# Patient Record
Sex: FEMALE | Race: BLACK OR AFRICAN AMERICAN
[De-identification: names, ages, dates, MRNs, and addresses within clinical notes are randomized per-mention and may not be internally consistent; named-entity substitution may affect disease eponyms.]

---

## 2021-01-19 ENCOUNTER — HOSPITAL ENCOUNTER (OUTPATIENT)
Dept: HOSPITAL 61 - ER | Age: 81
Setting detail: OBSERVATION
LOS: 1 days | Discharge: HOME | End: 2021-01-20
Attending: INTERNAL MEDICINE | Admitting: INTERNAL MEDICINE
Payer: MEDICARE

## 2021-01-19 VITALS — SYSTOLIC BLOOD PRESSURE: 122 MMHG | DIASTOLIC BLOOD PRESSURE: 68 MMHG

## 2021-01-19 VITALS — BODY MASS INDEX: 31.8 KG/M2 | HEIGHT: 61 IN | WEIGHT: 168.43 LBS

## 2021-01-19 VITALS — SYSTOLIC BLOOD PRESSURE: 175 MMHG | DIASTOLIC BLOOD PRESSURE: 75 MMHG

## 2021-01-19 DIAGNOSIS — R29.705: ICD-10-CM

## 2021-01-19 DIAGNOSIS — Z23: ICD-10-CM

## 2021-01-19 DIAGNOSIS — R79.1: ICD-10-CM

## 2021-01-19 DIAGNOSIS — F12.90: ICD-10-CM

## 2021-01-19 DIAGNOSIS — M54.5: ICD-10-CM

## 2021-01-19 DIAGNOSIS — Z98.890: ICD-10-CM

## 2021-01-19 DIAGNOSIS — F17.210: ICD-10-CM

## 2021-01-19 DIAGNOSIS — I10: ICD-10-CM

## 2021-01-19 DIAGNOSIS — F41.9: ICD-10-CM

## 2021-01-19 DIAGNOSIS — Z79.82: ICD-10-CM

## 2021-01-19 DIAGNOSIS — Z90.49: ICD-10-CM

## 2021-01-19 DIAGNOSIS — I63.9: Primary | ICD-10-CM

## 2021-01-19 LAB
ANION GAP SERPL CALC-SCNC: 8 MMOL/L (ref 6–14)
APTT BLD: 32 SEC (ref 24–38)
BUN SERPL-MCNC: 15 MG/DL (ref 7–20)
CALCIUM SERPL-MCNC: 9.3 MG/DL (ref 8.5–10.1)
CHLORIDE SERPL-SCNC: 104 MMOL/L (ref 98–107)
CO2 SERPL-SCNC: 28 MMOL/L (ref 21–32)
CREAT SERPL-MCNC: 1 MG/DL (ref 0.6–1)
ERYTHROCYTE [DISTWIDTH] IN BLOOD BY AUTOMATED COUNT: 15 % (ref 11.5–14.5)
GFR SERPLBLD BASED ON 1.73 SQ M-ARVRAT: 64.2 ML/MIN
GLUCOSE SERPL-MCNC: 90 MG/DL (ref 70–99)
HCT VFR BLD CALC: 38 % (ref 36–47)
HGB BLD-MCNC: 12.9 G/DL (ref 12–15.5)
MCH RBC QN AUTO: 35 PG (ref 25–35)
MCHC RBC AUTO-ENTMCNC: 34 G/DL (ref 31–37)
MCV RBC AUTO: 102 FL (ref 79–100)
PLATELET # BLD AUTO: 240 X10^3/UL (ref 140–400)
POTASSIUM SERPL-SCNC: 3.9 MMOL/L (ref 3.5–5.1)
PROTHROMBIN TIME: 12.9 SEC (ref 11.7–14)
RBC # BLD AUTO: 3.74 X10^6/UL (ref 3.5–5.4)
SODIUM SERPL-SCNC: 140 MMOL/L (ref 136–145)
WBC # BLD AUTO: 4.5 X10^3/UL (ref 4–11)

## 2021-01-19 PROCEDURE — 96372 THER/PROPH/DIAG INJ SC/IM: CPT

## 2021-01-19 PROCEDURE — 85610 PROTHROMBIN TIME: CPT

## 2021-01-19 PROCEDURE — 99285 EMERGENCY DEPT VISIT HI MDM: CPT

## 2021-01-19 PROCEDURE — 97161 PT EVAL LOW COMPLEX 20 MIN: CPT

## 2021-01-19 PROCEDURE — G0378 HOSPITAL OBSERVATION PER HR: HCPCS

## 2021-01-19 PROCEDURE — 97116 GAIT TRAINING THERAPY: CPT

## 2021-01-19 PROCEDURE — 93005 ELECTROCARDIOGRAM TRACING: CPT

## 2021-01-19 PROCEDURE — 97165 OT EVAL LOW COMPLEX 30 MIN: CPT

## 2021-01-19 PROCEDURE — 85027 COMPLETE CBC AUTOMATED: CPT

## 2021-01-19 PROCEDURE — 71045 X-RAY EXAM CHEST 1 VIEW: CPT

## 2021-01-19 PROCEDURE — 92610 EVALUATE SWALLOWING FUNCTION: CPT

## 2021-01-19 PROCEDURE — 70450 CT HEAD/BRAIN W/O DYE: CPT

## 2021-01-19 PROCEDURE — 36415 COLL VENOUS BLD VENIPUNCTURE: CPT

## 2021-01-19 PROCEDURE — 82962 GLUCOSE BLOOD TEST: CPT

## 2021-01-19 PROCEDURE — 70551 MRI BRAIN STEM W/O DYE: CPT

## 2021-01-19 PROCEDURE — 85730 THROMBOPLASTIN TIME PARTIAL: CPT

## 2021-01-19 PROCEDURE — 90471 IMMUNIZATION ADMIN: CPT

## 2021-01-19 PROCEDURE — 80061 LIPID PANEL: CPT

## 2021-01-19 PROCEDURE — 90686 IIV4 VACC NO PRSV 0.5 ML IM: CPT

## 2021-01-19 PROCEDURE — 80048 BASIC METABOLIC PNL TOTAL CA: CPT

## 2021-01-19 PROCEDURE — G0379 DIRECT REFER HOSPITAL OBSERV: HCPCS

## 2021-01-19 RX ADMIN — GABAPENTIN SCH MG: 300 CAPSULE ORAL at 21:03

## 2021-01-19 NOTE — RAD
EXAM: CT Head without IV contrast



INDICATION: Reason: LEFT SIDED FACIAL DROOP / Spl. Instructions:  / History: 



TECHNIQUE: Multi-detector row CT images were obtained of the head without the use of IV contrast. All
 CT scans performed at this facility utilize dose optimization techniques as appropriate to the exam,
 including the following: Automated exposure control and adjustment of the mA and/or KV according to 
patient size (this includes techniques or standardized protocols for targeted exams where dose is ind
ication/reason for exam).



COMPARISON: Noncontrast head CT of 11/13/2010



FINDINGS: 



BRAIN PARENCHYMA: No evidence of acute intraparenchymal hemorrhage or infarct. Mild generalized paren
chymal volume loss and white matter low density compatible with chronic ischemic microvascular change
 is present. Tiny lucencies in the dolly, bilateral basal ganglia, and left thalamus are present, comp
atible with multiple chronic lacunar infarcts.



VENTRICLES & EXTRA-AXIAL SPACES:  Ventricles are enlarged in proportion to the degree of parenchymal 
volume loss present. The subarachnoid hemorrhage evident previously has since resolved.. Basilar cist
erns are patent. No pathologic extra-axial fluid collection or mass.



ORBITS: Orbital contents are unremarkable.



SINUSES:  Visualized paranasal sinuses and mastoid air cells are clear.



OSSEOUS & SOFT TISSUES:  Calvarium and skull base are intact.



IMPRESSION:



Atrophy, chronic ischemic microvascular changes, and chronic lacunar infarcts. No acute intracranial 
pathology.







EXAM: XR CHEST 1V



INDICATION: Reason: LEFT SIDED FACIAL DROOP / Spl. Instructions:  / History: .



TECHNIQUE: Single view 



COMPARISON: None



FINDINGS:



The heart size is upper normal.



The great vessels appear unremarkable.



There is no hilar or mediastinal mass.



The lungs are clear.



There is no pleural effusion or pneumothorax.



There are no significant osseous abnormalities.



IMPRESSION:



Upper normal heart size with no superimposed active cardiopulmonary disease.





Electronically signed by: David Christian MD (1/19/2021 4:38 PM) [id number]

## 2021-01-19 NOTE — RAD
EXAM: CT Head without IV contrast



INDICATION: Reason: LEFT SIDED FACIAL DROOP / Spl. Instructions:  / History: 



TECHNIQUE: Multi-detector row CT images were obtained of the head without the use of IV contrast. All
 CT scans performed at this facility utilize dose optimization techniques as appropriate to the exam,
 including the following: Automated exposure control and adjustment of the mA and/or KV according to 
patient size (this includes techniques or standardized protocols for targeted exams where dose is ind
ication/reason for exam).



COMPARISON: Noncontrast head CT of 11/13/2010



FINDINGS: 



BRAIN PARENCHYMA: No evidence of acute intraparenchymal hemorrhage or infarct. Mild generalized paren
chymal volume loss and white matter low density compatible with chronic ischemic microvascular change
 is present. Tiny lucencies in the odlly, bilateral basal ganglia, and left thalamus are present, comp
atible with multiple chronic lacunar infarcts.



VENTRICLES & EXTRA-AXIAL SPACES:  Ventricles are enlarged in proportion to the degree of parenchymal 
volume loss present. The subarachnoid hemorrhage evident previously has since resolved.. Basilar cist
erns are patent. No pathologic extra-axial fluid collection or mass.



ORBITS: Orbital contents are unremarkable.



SINUSES:  Visualized paranasal sinuses and mastoid air cells are clear.



OSSEOUS & SOFT TISSUES:  Calvarium and skull base are intact.



IMPRESSION:



Atrophy, chronic ischemic microvascular changes, and chronic lacunar infarcts. No acute intracranial 
pathology.







EXAM: XR CHEST 1V



INDICATION: Reason: LEFT SIDED FACIAL DROOP / Spl. Instructions:  / History: .



TECHNIQUE: Single view 



COMPARISON: None



FINDINGS:



The heart size is upper normal.



The great vessels appear unremarkable.



There is no hilar or mediastinal mass.



The lungs are clear.



There is no pleural effusion or pneumothorax.



There are no significant osseous abnormalities.



IMPRESSION:



Upper normal heart size with no superimposed active cardiopulmonary disease.





Electronically signed by: David Christian MD (1/19/2021 4:38 PM) [id number]

## 2021-01-19 NOTE — PHYS DOC
Past Medical History


Past Medical History:  Hypertension


Additional Past Medical Histor:  "STATES HX CVA SAYS KU"


Past Surgical History:  No Surgical History


Smoking Status:  Current Every Day Smoker


Alcohol Use:  Occasionally





General Adult


EDM:


Chief Complaint:  NEURO SYMPTOMS/DEFICITS





HPI:


HPI:





Patient is a 80  year old female presents to the emergency room.  Patient states

she was sent here by her family members because she had some facial droop that 

started about 4 to 5 days ago.  Granddaughter stated at bedside states that she 

last seen her normal 3 weeks ago.  Patient states that she was talking to her 

son on the phone 4 or 5 days ago and he noticed that she was having some slurred

speech.  Patient states that she noticed a left-sided facial droop over the 

weekend.  Patient denies headache, denies recent fever or chills, denies chest 

pain, denies shortness of breath, denies congestion.  Patient denies any recent 

trauma.  Patient denies any hearing loss or hearing changes, patient denies any 

loss of taste or loss of smell.  Patient denies sore throat.  Patient does 

states she has a history of trigeminal neuralgia however denies any trigeminal 

neuralgia nerve pain.  Patient states she takes lisinopril, Tegretol, 

gabapentin, and levothyroxine at home.  Patient denies any other physical 

symptoms or physical complaints.  Patient states that she drinks Alise 

nightly, smokes a pack cigarettes a day, smokes marijuana occasionally.





Review of Systems:


Review of Systems:


14 body systems of review of systems have been reviewed.  See HPI for pertinent 

positives and negative responses, otherwise all other systems are negative, 

nonpertinent or noncontributory.





Heart Score:


Risk Factors:


Risk Factors:  DM, Current or recent (<one month) smoker, HTN, HLP, family 

history of CAD, obesity.


Risk Scores:


Score 0 - 3:  2.5% MACE over next 6 weeks - Discharge Home


Score 4 - 6:  20.3% MACE over next 6 weeks - Admit for Clinical Observation


Score 7 - 10:  72.7% MACE over next 6 weeks - Early Invasive Strategies





Allergies:


Allergies:





Allergies








Coded Allergies Type Severity Reaction Last Updated Verified


 


  No Known Drug Allergies    4/11/16 No











Physical Exam:


PE:





Constitutional: Well developed, well nourished, no acute distress, non-toxic 

appearance. 


HENT: Normocephalic, atraumatic, bilateral external ears normal, oropharynx 

moist, no oral exudates, nose normal. 


Eyes: PERRLA, EOMI, conjunctiva normal, no discharge.  


Neck: Normal range of motion, no tenderness, supple, no stridor.  


Cardiovascular:Heart rate regular rhythm, no murmur, heart sounds S1-S2 

consultation.


Lungs & Thorax:  Bilateral breath sounds clear to auscultation all lung fields.


Abdomen: Bowel sounds normal, soft, no tenderness, no masses, no pulsatile 

masses.  


Skin: Warm, dry, no erythema, no rash.  


Back: No tenderness, no CVA tenderness.  


Extremities: No tenderness, no cyanosis, no clubbing, ROM intact, no edema.  


Neurologic: Alert and oriented X 3, normal motor function, normal sensory 

function, no focal deficits noted.  Except for left-sided facial droop, no 

hyperacusis elicited, please see NIHSS stroke scale.


Psychologic: Affect normal, judgement normal, mood normal.





Current Patient Data:


Labs:





                                Laboratory Tests








Test


 1/19/21


14:56 1/19/21


15:00


 


Glucose (Fingerstick)


 108 mg/dL


(70-99)  H 





 


White Blood Count


 


 4.5 x10^3/uL


(4.0-11.0)


 


Red Blood Count


 


 3.74 x10^6/uL


(3.50-5.40)


 


Hemoglobin


 


 12.9 g/dL


(12.0-15.5)


 


Hematocrit


 


 38.0 %


(36.0-47.0)


 


Mean Corpuscular Volume


 


 102 fL


()  H


 


Mean Corpuscular Hemoglobin  35 pg (25-35)  


 


Mean Corpuscular Hemoglobin


Concent 


 34 g/dL


(31-37)


 


Red Cell Distribution Width


 


 15.0 %


(11.5-14.5)  H


 


Platelet Count


 


 240 x10^3/uL


(140-400)


 


Prothrombin Time


 


 12.9 SEC


(11.7-14.0)


 


Prothrombin Time INR  1.0 (0.8-1.1)  


 


Activated Partial


Thromboplast Time 


 32 SEC (24-38)





 


Sodium Level


 


 140 mmol/L


(136-145)


 


Potassium Level


 


 3.9 mmol/L


(3.5-5.1)


 


Chloride Level


 


 104 mmol/L


()


 


Carbon Dioxide Level


 


 28 mmol/L


(21-32)


 


Anion Gap  8 (6-14)  


 


Blood Urea Nitrogen


 


 15 mg/dL


(7-20)


 


Creatinine


 


 1.0 mg/dL


(0.6-1.0)


 


Estimated GFR


(Cockcroft-Gault) 


 64.2  





 


Glucose Level


 


 90 mg/dL


(70-99)


 


Calcium Level


 


 9.3 mg/dL


(8.5-10.1)





                                Laboratory Tests


1/19/21 15:00








                                Laboratory Tests


1/19/21 15:00








Vital Signs:





                                   Vital Signs








  Date Time  Temp Pulse Resp B/P (MAP) Pulse Ox O2 Delivery O2 Flow Rate FiO2


 


1/19/21 15:29  71  150/76 (100) 94   


 


1/19/21 15:06 98.1  18   Room Air  





 98.1       











EKG:


EKG:


[]





Radiology/Procedures:


Radiology/Procedures:


SEX: F                    EXAM DT: 01/19/21         ACCESSION#: [id number]


STATUS: REG ER            ORD. PHYSICIAN: ARANZA CAMACHO


REASON: LEFT SIDED FACIAL DROOP


PROCEDURE: CT HEAD WO CONTRAST





EXAM: CT Head without IV contrast





INDICATION: Reason: LEFT SIDED FACIAL DROOP / Spl. Instructions:  / History: 





TECHNIQUE: Multi-detector row CT images were obtained of the head without the 

use of IV contrast. All CT scans performed at this facility utilize dose 

optimization techniques as appropriate to the exam, including the following: Aut

omated exposure control and adjustment of the mA and/or KV according to patient 

size (this includes techniques or standardized protocols for targeted exams 

where dose is indication/reason for exam).





COMPARISON: Noncontrast head CT of 11/13/2010





FINDINGS: 





BRAIN PARENCHYMA: No evidence of acute intraparenchymal hemorrhage or infarct. 

Mild generalized parenchymal volume loss and white matter low density compatible

 with chronic ischemic microvascular change is present. Tiny lucencies in the 

dolly, bilateral basal ganglia, and left thalamus are present, compatible with 

multiple chronic lacunar infarcts.





VENTRICLES & EXTRA-AXIAL SPACES:  Ventricles are enlarged in proportion to the 

degree of parenchymal volume loss present. The subarachnoid hemorrhage evident 

previously has since resolved.. Basilar cisterns are patent. No pathologic 

extra-axial fluid collection or mass.





ORBITS: Orbital contents are unremarkable.





SINUSES:  Visualized paranasal sinuses and mastoid air cells are clear.





OSSEOUS & SOFT TISSUES:  Calvarium and skull base are intact.





IMPRESSION:





Atrophy, chronic ischemic microvascular changes, and chronic lacunar infarcts. 

No acute intracranial pathology.











EXAM: XR CHEST 1V





INDICATION: Reason: LEFT SIDED FACIAL DROOP / Spl. Instructions:  / History: .





TECHNIQUE: Single view 





COMPARISON: None





FINDINGS:





The heart size is upper normal.





The great vessels appear unremarkable.





There is no hilar or mediastinal mass.





The lungs are clear.





There is no pleural effusion or pneumothorax.





There are no significant osseous abnormalities.





IMPRESSION:





Upper normal heart size with no superimposed active cardiopulmonary disease.








Electronically signed by: Daria Christian MD (1/19/2021 4:38 PM) [id number]














DICTATED and SIGNED BY:     DARIA CHRISTIAN MD


DATE:     01/19/21 [id number] 0





Course & Med Decision Making:


Course & Med Decision Making


Pertinent Labs and Imaging studies reviewed. (See chart for details)





80-year-old female, vital signs stable, sent to the emergency department with 

signs and symptoms concerning for CVA.  Code stroke not initiated related to CVA

 symptoms starting approximately 4 to 5 days out. 





CT negative for acute hemorrhagic stroke, discussed case with [facility] physician Dr. Henriquez who agreed to accept patient under admission to the [facility] unit for CVA.

  Discussed admission with patient who was amendable to this plan.  Discussed 

case with neurologist Dr. Mckee.  Patient admitted to [facility] floor, Dr. Henriquez assume patient care.





Dragon Disclaimer:


Dragon Disclaimer:


This electronic medical record was generated, in whole or in part, using a voice

 recognition dictation system.





Departure


Departure


Impression:  


   Primary Impression:  


   CVA (cerebral vascular accident)


   Qualified Codes:  I63.9 - Cerebral infarction, unspecified


Disposition:  09 ADMITTED AS INPT THIS HOSP


Admitting Physician:  [facility] (Admit to Dr. Henri[facility])


Condition:  GUARDED


Referrals:  


UNKNOWN PCP NAME (PCP)





NIHSS Stroke Scale


NIH Stroke Scale:  








NIH Stroke Scale Response (Comments) Value


 


Level of Consciousness:                 0 Alert/Responsive 0


 


LOC Questions:                          0 Answers both correctly 0


 


LOC Commands:                           0 Performs both tasks 0


 


Best Gaze:                              0 Normal 0


 


Visual:                                 0 No visual loss 0


 


Facial Palsy:                           3 Complete paralysis 3


 


Motor - Left Arm                        0 No drift 0


 


Motor - Right Arm                       0 No drift 0


 


Motor - Left Leg                        0 No drift 0


 


Motor: Right Leg                        0 No drift 0


 


Limb Ataxia:                            0 Absent 0


 


Sensory:                                0 No loss 0


 


Best Language:                          1 Mild to mod aphasia 1


 


Dysathria:                              1 Mild to moderate 1


 


Extinction and Inattention:             0 Normal 0


 


Total  5

















ARANZA CAMACHO       Jan 19, 2021 17:59

## 2021-01-19 NOTE — PDOC1
History and Physical


Date of Admission


Date of Admission


DATE: 1/19/21 


TIME: 19:48





Source


Source:  Chart review, Patient





History of Present Illness


History of Present Illness


MS. Aleman,  is a 80  year old female admit from ER for facial numbness and 

weakness, 


 Patient states she was sent here by her family members because she had some 

facial droop that started about 4 to 5 days ago.  Granddaughter stated at 

bedside states that changes happened today only, and he had pictures from 

earlier today that look worse than Ms. Aleman does now with right eye swollen 

and closed appearance in a photo.


The timeline from when she was normal is now variable per history.


 Patient states that she was talking to her son earlier, and he noticed that she

was having some slurred speech.  Patient states that she noticed a left-sided 

facial droop over the weekend. She had a prior stroke 10 years ago and went from

here to [place] for rehab,.


 Patient does states she has a history of trigeminal neuralgia however denies 

any trigeminal neuralgia nerve pain.





Past Medical History


Cardiovascular:  HTN


Pulmonary:  No pertinent hx


CENTRAL NERVOUS SYSTEM:  CVA


GI:  No pertinent hx


Psych:  No pertinent hx


Musculoskeletal:   low back pain





Past Surgical History


Past Surgical History:  Other





Family History


Family History:  No Significant





Social History


Smoke:  <1 pack per day


ALCOHOL:  other (daily tequila)


Drugs:  None





Current Problem List


Problem List


Problems


Medical Problems:


(1) CVA (cerebral vascular accident)


Status: Acute  











Current Medications


Current Medications





Current Medications


Acetaminophen (Tylenol) 650 mg PRN Q6HRS  PRN PO MILD PAIN / TEMP > 100.3'F;  

Start 1/19/21 at 19:00


Aspirin (Ecotrin) 325 mg DAILYWBKFT PO ;  Start 1/20/21 at 08:00


Aspirin (Aspirin Rectal Supp) 300 mg PRN DAILY  PRN AL IF UNABLE TO TAKE PO;  

Start 1/19/21 at 19:00





Active Scripts


Active


Reported


Levothyroxine Sodium 75 Mcg Tablet 1 Tab PO DAILY


Lisinopril 20 Mg Tablet 1 Tab PO DAILY


Carbamazepine 100 Mg Tab.chew 1 Tab PO BID


Naproxen 500 Mg Tablet  PO 


Gabapentin 300 Mg Capsule 1 Cap PO TID





Allergies


Allergies:  


Coded Allergies:  


     No Known Drug Allergies (Unverified , 4/11/16)





ROS


General:  YES: Fatigue; 


   No: Chills, Night Sweats, Malaise, Appetite, Other


PSYCHOLOGICAL ROS:  YES: Anxiety, Irritablity; 


   No: Behavioral Disorder, Concentration difficultie, Decreased libido, 

Depression, Disorientation, Hallucinations, Hostility, Memory difficulties, Mood

Swings, Obsessive thoughts, Physical abuse, Sexual abuse, Sleep disturbances, 

Suicidal ideation, Other


Eyes:  No Blurry vision, No Decreased vision, No Double vision, No Dry eyes, No 

Excessive tearing, No Eye Pain, No Itchy Eyes, No Loss of vision, No 

Photophobia, No Scotomata, No Uses contacts, No Uses glasses, No Other


HEENT:  YES: Heacaches; 


   No: Visual Changes, Hearing change, Nasal congestion, Nasal discharge, Oral 

lesions, Sinus pain, Sore Throat, Epistaxis, Sneezing, Snoring, Tinnitus, 

Vertigo, Vocal changes, Other


Respiratory:  No: Cough, Hemoptysis, Orthopnea, Pleuritic Pain, Shortness of 

breath, SOB with excertion, Sputum Changes, Stridor, Tachypnea, Wheezing, Other


Cardiovascular:  No Chest Pain, No Palpitations, No Orthopnea, No Paroxysmal 

Noc. Dyspnea, No Edema, No Lt Headedness, No Other


Gastrointestinal:  Yes Nausea; 


   No Vomiting, No Abdominal Pain, No Diarrhea, No Constipation, No Melena, No 

Hematochezia, No Other


Genitourinary:  No Dysuria, No Frequency, No Incontinence, No Hematuria, No 

Retention, No Discharge, No Urgency, No Pain, No Flank Pain, No Other, No , No ,

No , No , No , No , No 


Musculoskeletal:  Yes Joint Stiffness; 


   No Gait Disturbance, No Joint Pain, No Joint Swelling, No Muscle Pain, No 

Muscular Weakness, No Pain In:, No Swelling In:, No Other


Neurological:  Yes Gait Disturbance (old person walk"); 


   No Behavorial Changes, No Bowel/Bladder ControlChng, No Confusion, No 

Dizziness, No Headaches, No Impaired Coord/balance, No Memory Loss, No 

Numbness/Tingling, No Seizures, No Speech Problems, No Tremors, No Visual 

Changes, No Weakness, No Other


Skin:  No Dry Skin, No Eczema, No Hair Changes, No Lumps, No Mole Changes, No 

Mottling, No Nail Changes, No Pruritus, No Rash, No Skin Lesion Changes, No 

Other, No Acne





Physical Exam


General:  Alert, Oriented X3, Cooperative, No acute distress


HEENT:  Atraumatic


Lungs:  Clear to auscultation


Heart:  S1S2


Abdomen:  Normal bowel sounds, Soft


Extremities:  No clubbing, No cyanosis, No edema


Skin:  No significant lesion


Neuro:  Normal gait, Sensation intact, Cranial nerves 3-12 NL





Vitals


Vitals





Vital Signs








  Date Time  Temp Pulse Resp B/P (MAP) Pulse Ox O2 Delivery O2 Flow Rate FiO2


 


1/19/21 15:29  71  150/76 (100) 94   


 


1/19/21 15:06 98.1  18   Room Air  





 98.1       











Labs


Labs





Laboratory Tests








Test


 1/19/21


14:56 1/19/21


15:00


 


Glucose (Fingerstick)


 108 mg/dL


(70-99) 





 


White Blood Count


 


 4.5 x10^3/uL


(4.0-11.0)


 


Red Blood Count


 


 3.74 x10^6/uL


(3.50-5.40)


 


Hemoglobin


 


 12.9 g/dL


(12.0-15.5)


 


Hematocrit


 


 38.0 %


(36.0-47.0)


 


Mean Corpuscular Volume


 


 102 fL


()


 


Mean Corpuscular Hemoglobin  35 pg (25-35) 


 


Mean Corpuscular Hemoglobin


Concent 


 34 g/dL


(31-37)


 


Red Cell Distribution Width


 


 15.0 %


(11.5-14.5)


 


Platelet Count


 


 240 x10^3/uL


(140-400)


 


Prothrombin Time


 


 12.9 SEC


(11.7-14.0)


 


Prothromb Time International


Ratio 


 1.0 (0.8-1.1) 





 


Activated Partial


Thromboplast Time 


 32 SEC (24-38) 





 


Sodium Level


 


 140 mmol/L


(136-145)


 


Potassium Level


 


 3.9 mmol/L


(3.5-5.1)


 


Chloride Level


 


 104 mmol/L


()


 


Carbon Dioxide Level


 


 28 mmol/L


(21-32)


 


Anion Gap  8 (6-14) 


 


Blood Urea Nitrogen


 


 15 mg/dL


(7-20)


 


Creatinine


 


 1.0 mg/dL


(0.6-1.0)


 


Estimated GFR


(Cockcroft-Gault) 


 64.2 





 


Glucose Level


 


 90 mg/dL


(70-99)


 


Calcium Level


 


 9.3 mg/dL


(8.5-10.1)








Laboratory Tests








Test


 1/19/21


14:56 1/19/21


15:00


 


Glucose (Fingerstick)


 108 mg/dL


(70-99) 





 


White Blood Count


 


 4.5 x10^3/uL


(4.0-11.0)


 


Red Blood Count


 


 3.74 x10^6/uL


(3.50-5.40)


 


Hemoglobin


 


 12.9 g/dL


(12.0-15.5)


 


Hematocrit


 


 38.0 %


(36.0-47.0)


 


Mean Corpuscular Volume


 


 102 fL


()


 


Mean Corpuscular Hemoglobin  35 pg (25-35) 


 


Mean Corpuscular Hemoglobin


Concent 


 34 g/dL


(31-37)


 


Red Cell Distribution Width


 


 15.0 %


(11.5-14.5)


 


Platelet Count


 


 240 x10^3/uL


(140-400)


 


Prothrombin Time


 


 12.9 SEC


(11.7-14.0)


 


Prothromb Time International


Ratio 


 1.0 (0.8-1.1) 





 


Activated Partial


Thromboplast Time 


 32 SEC (24-38) 





 


Sodium Level


 


 140 mmol/L


(136-145)


 


Potassium Level


 


 3.9 mmol/L


(3.5-5.1)


 


Chloride Level


 


 104 mmol/L


()


 


Carbon Dioxide Level


 


 28 mmol/L


(21-32)


 


Anion Gap  8 (6-14) 


 


Blood Urea Nitrogen


 


 15 mg/dL


(7-20)


 


Creatinine


 


 1.0 mg/dL


(0.6-1.0)


 


Estimated GFR


(Cockcroft-Gault) 


 64.2 





 


Glucose Level


 


 90 mg/dL


(70-99)


 


Calcium Level


 


 9.3 mg/dL


(8.5-10.1)











VTE Prophylaxis Ordered


VTE Prophylaxis Devices:  No


VTE Pharmacological Prophylaxi:  Yes





Assessment/Plan


Assessment/Plan


RIght eye droop,  today, earlier,  maybe 12 hours,   family had previously said 

days


TIA,  CT neg,  Neuro consult,  may need MRI


Tobacco use disorder


prior CVA, 10year ago,


right facial nerve pain,   on tegretol,   


htn, lisinopril, not compliant, 


EtOH use,  < 2 per day per pt





Justifications for Admission


Other Justification














PADMA CLIFTON MD                 Jan 19, 2021 19:54

## 2021-01-20 VITALS — SYSTOLIC BLOOD PRESSURE: 155 MMHG | DIASTOLIC BLOOD PRESSURE: 69 MMHG

## 2021-01-20 VITALS — DIASTOLIC BLOOD PRESSURE: 69 MMHG | SYSTOLIC BLOOD PRESSURE: 164 MMHG

## 2021-01-20 VITALS — DIASTOLIC BLOOD PRESSURE: 67 MMHG | SYSTOLIC BLOOD PRESSURE: 155 MMHG

## 2021-01-20 VITALS — DIASTOLIC BLOOD PRESSURE: 61 MMHG | SYSTOLIC BLOOD PRESSURE: 144 MMHG

## 2021-01-20 LAB
CHOLEST SERPL-MCNC: 192 MG/DL (ref 0–200)
CHOLEST/HDLC SERPL: 3.1 {RATIO}
HDLC SERPL-MCNC: 62 MG/DL (ref 40–60)
LDLC: 106 MG/DL (ref 0–100)
TRIGL SERPL-MCNC: 120 MG/DL (ref 0–150)
VLDLC: 24 MG/DL (ref 0–40)

## 2021-01-20 RX ADMIN — GABAPENTIN SCH MG: 300 CAPSULE ORAL at 11:25

## 2021-01-20 RX ADMIN — GABAPENTIN SCH MG: 300 CAPSULE ORAL at 16:28

## 2021-01-20 NOTE — NUR
Discharge Note:



ALEX HDEZ [address]



Discharge instructions and discharge home medications reviewed with Patient and a copy 
given. All questions have been answered and understanding verbalized. 



The following instructions and handouts were given: follow up with primary or Dr. Sims. 
contact info given. medication and changes to condition.



Discontinued lines and drains: IV removed. no lines present at discharge.



Patient discharged to home. left by wheelchair to her daughters private vehicle.

## 2021-01-20 NOTE — RAD
MRI of the Brain without Contrast 1/20/2021



Clinical History: Left facial droop.



Technique: Unenhanced T1-weighted sagittal and axial, T2-weighted axial and coronal, and FLAIR, gradi
ent echo and diffusion-weighted axial images of the brain were obtained. 



Findings: Comparison is made to the patient's CT scan of the head dated 1/19/2021.



There is generalized parenchymal atrophy. Patchy, confluent and multiple focal areas of abnormally in
creased signal intensity are seen within the periventricular and subcortical white matter of both cer
ebral hemispheres on the FLAIR and T2-weighted images consistent with areas of small vessel ischemic 
disease. Multiple old areas of lacunar infarction are seen involving the basal ganglia regions, the l
eft and right thalamus and the dolly which measure 3 to 7 mm in size.



No acute parenchymal abnormality is seen. No extra-axial fluid collection is seen. There is no MRI ev
idence of acute ischemia/infarction.



The paranasal sinuses are essentially clear. Normal flow voids are seen within the major vascular str
uctures surrounding the brain parenchyma.



Impression: No acute parenchymal abnormality is seen.



Electronically signed by: William Graves MD (1/20/2021 4:49 PM) [id number]

## 2021-01-20 NOTE — PDOC3
Discharge Summary


Visit Information


Date of Admission:  Jan 19, 2021


Date of Discharge:  Jan 20, 2021


Final Diagnosis


presumed TIA on admit


per Neuro eval, 


Left Bell's palsy,,  


History of right trigeminal neuralgia.


Hypertension


Longstanding gait disorder

















Problems


Medical Problems:


(1) CVA (cerebral vascular accident)


Status: Acute  








Brief Hospital Course


Allergies





                                    Allergies








Coded Allergies Type Severity Reaction Last Updated Verified


 


  No Known Drug Allergies    4/11/16 No








Vital Signs





Vital Signs








  Date Time  Temp Pulse Resp B/P (MAP) Pulse Ox O2 Delivery O2 Flow Rate FiO2


 


1/20/21 11:25  70  164/69    


 


1/20/21 10:56 98.8  16  94 Room Air  





 98.8       








Lab Results





Laboratory Tests








Test


 1/19/21


14:56 1/19/21


15:00 1/20/21


05:50


 


Glucose (Fingerstick)


 108 mg/dL


(70-99) 


 





 


White Blood Count


 


 4.5 x10^3/uL


(4.0-11.0) 





 


Red Blood Count


 


 3.74 x10^6/uL


(3.50-5.40) 





 


Hemoglobin


 


 12.9 g/dL


(12.0-15.5) 





 


Hematocrit


 


 38.0 %


(36.0-47.0) 





 


Mean Corpuscular Volume


 


 102 fL


() 





 


Mean Corpuscular Hemoglobin  35 pg (25-35)  


 


Mean Corpuscular Hemoglobin


Concent 


 34 g/dL


(31-37) 





 


Red Cell Distribution Width


 


 15.0 %


(11.5-14.5) 





 


Platelet Count


 


 240 x10^3/uL


(140-400) 





 


Prothrombin Time


 


 12.9 SEC


(11.7-14.0) 





 


Prothromb Time International


Ratio 


 1.0 (0.8-1.1) 


 





 


Activated Partial


Thromboplast Time 


 32 SEC (24-38) 


 





 


Sodium Level


 


 140 mmol/L


(136-145) 





 


Potassium Level


 


 3.9 mmol/L


(3.5-5.1) 





 


Chloride Level


 


 104 mmol/L


() 





 


Carbon Dioxide Level


 


 28 mmol/L


(21-32) 





 


Anion Gap  8 (6-14)  


 


Blood Urea Nitrogen


 


 15 mg/dL


(7-20) 





 


Creatinine


 


 1.0 mg/dL


(0.6-1.0) 





 


Estimated GFR


(Cockcroft-Gault) 


 64.2 


 





 


Glucose Level


 


 90 mg/dL


(70-99) 





 


Calcium Level


 


 9.3 mg/dL


(8.5-10.1) 





 


Triglycerides Level


 


 


 120 mg/dL


(0-150)


 


Cholesterol Level


 


 


 192 mg/dL


(0-200)


 


LDL Cholesterol, Calculated


 


 


 106 mg/dL


(0-100)


 


VLDL Cholesterol, Calculated


 


 


 24 mg/dL


(0-40)


 


Non-HDL Cholesterol Calculated


 


 


 130 mg/dL


(0-129)


 


HDL Cholesterol


 


 


 62 mg/dL


(40-60)


 


Cholesterol/HDL Ratio   3.1 








Laboratory Tests








Test


 1/19/21


14:56 1/19/21


15:00 1/20/21


05:50


 


Glucose (Fingerstick)


 108 mg/dL


(70-99) 


 





 


White Blood Count


 


 4.5 x10^3/uL


(4.0-11.0) 





 


Red Blood Count


 


 3.74 x10^6/uL


(3.50-5.40) 





 


Hemoglobin


 


 12.9 g/dL


(12.0-15.5) 





 


Hematocrit


 


 38.0 %


(36.0-47.0) 





 


Mean Corpuscular Volume


 


 102 fL


() 





 


Mean Corpuscular Hemoglobin  35 pg (25-35)  


 


Mean Corpuscular Hemoglobin


Concent 


 34 g/dL


(31-37) 





 


Red Cell Distribution Width


 


 15.0 %


(11.5-14.5) 





 


Platelet Count


 


 240 x10^3/uL


(140-400) 





 


Prothrombin Time


 


 12.9 SEC


(11.7-14.0) 





 


Prothromb Time International


Ratio 


 1.0 (0.8-1.1) 


 





 


Activated Partial


Thromboplast Time 


 32 SEC (24-38) 


 





 


Sodium Level


 


 140 mmol/L


(136-145) 





 


Potassium Level


 


 3.9 mmol/L


(3.5-5.1) 





 


Chloride Level


 


 104 mmol/L


() 





 


Carbon Dioxide Level


 


 28 mmol/L


(21-32) 





 


Anion Gap  8 (6-14)  


 


Blood Urea Nitrogen


 


 15 mg/dL


(7-20) 





 


Creatinine


 


 1.0 mg/dL


(0.6-1.0) 





 


Estimated GFR


(Cockcroft-Gault) 


 64.2 


 





 


Glucose Level


 


 90 mg/dL


(70-99) 





 


Calcium Level


 


 9.3 mg/dL


(8.5-10.1) 





 


Triglycerides Level


 


 


 120 mg/dL


(0-150)


 


Cholesterol Level


 


 


 192 mg/dL


(0-200)


 


LDL Cholesterol, Calculated


 


 


 106 mg/dL


(0-100)


 


VLDL Cholesterol, Calculated


 


 


 24 mg/dL


(0-40)


 


Non-HDL Cholesterol Calculated


 


 


 130 mg/dL


(0-129)


 


HDL Cholesterol


 


 


 62 mg/dL


(40-60)


 


Cholesterol/HDL Ratio   3.1 








Brief Hospital Course


Ms. Roy  is a 80 old admit with facial numbness, tingling, weakness,  eye 

drop,  some trouble talking,  improved in ER.


Neuro consult, eval ,    pending MRI of the brain


if neg MRI,  pulse of steroids, 40 mg today, 30 mg tomorrow, 20 mg on 1/22, 10 

mg on 1/23, 





Follow-up with neurology if facial palsy does not improve over the next few 

weeks.





Discharge Information


Condition at Discharge:  Improved


Follow Up:  Weeks


Disposition/Orders:  D/C to Home


Scheduled


Carbamazepine (Carbamazepine) 100 Mg Tab.chew, 1 TAB PO BID, (Reported)


   Entered as Reported by: MADALYN NICOLE on 1/19/21 1721


   Last Action: Converted on 1/19/21 2049 by PADMA MARSH


Gabapentin (Gabapentin) 300 Mg Capsule, 1 CAP PO TID, (Reported)


   Entered as Reported by: MADALYN NICOLE on 1/19/21 1721


   Last Action: Continued on 1/19/21 2049 by PADMA MARSH


Levothyroxine Sodium (Levothyroxine Sodium) 75 Mcg Tablet, 1 TAB PO DAILY, 

(Reported)


   Entered as Reported by: MADALYN NICOLE on 1/19/21 1721


   Last Action: Continued on 1/19/21 2049 by PADMA MARSH


Lisinopril (Lisinopril) 20 Mg Tablet, 1 TAB PO DAILY, (Reported)


   Entered as Reported by: MADALYN NICOLE on 1/19/21 1721


   Last Action: Continued on 1/19/21 2049 by PADMA MARSH


Prednisone (Prednisone **) 10 Mg Tablet, 10 MG PO UD for trigeminal nerve palsy,

#10 Ref 0


   Take   4 tablets by mouth daily for 1 day, then take 3 tablets by mouth daily

for 1 day, then take 2 tablets by mouth daily for 1 day, then take 1 tablets by 

mouth daily for 1 day, then stop. 


   Prescribed by: PADMA CLIFTON on 1/20/21 1255





Scheduled PRN


Naproxen (Naproxen) 500 Mg Tablet, 500 MG PO PRN DAILY PRN for PAIN, (Reported)


   Entered as Reported by: MADALYN NICOLE on 1/19/21 1721


   Last Action: Continued on 1/19/21 2049 by PADMA CLIFTON





Patient Instructions


Patient Instructions


face to face 


discussed plan


> 30 min total time





Justicifation of Admission Dx:


Justifications for Admission:


Justification of Admission Dx:  No











PADMA CLIFTON MD                 Jan 20, 2021 12:58

## 2021-01-20 NOTE — NUR
SS following for discharge planning. SS reviewed pt chart and discussed with pt RN. Pt is 
from home with family and is currently on room air. PT recommended home. Pt on regular diet. 
Discharge order on the chart for home with self care. 

-------------------------------------------------------------------------------

Addendum: 01/20/21 at 1336 by GEORGE SUTTON 

-------------------------------------------------------------------------------

CORRECTION TO NOTE: Pt lives at home alone.

## 2021-01-20 NOTE — PDOC2
NEUROLOGY CONSULT


Date of Service


DOS:


DATE: 1/20/21 


TIME: 11:56





Reason for Consult


Reason for Consult:


Left facial weakness





Referring Physician


Referring Physician:


Dr. Henriquez





Source


Source:  Chart review, Patient





History of Present Illness


History of Present Illness


The patient is an 80-year-old right-handed female who 3 days ago noticed some 

feeling of swelling in her left lips, then a left facial droop.  Symptoms 

persisted and the patient finally came to the emergency department at the 

insistence of her family yesterday.  Now she is noticing some fullness in the 

left ear.  She has never had a stroke, seizure, or head injury.  She has some 

dysarthria, we are also worried about dysphagia, she denies vertigo, tinnitus, 

diplopia, or weakness or numbness in the limbs.  She follows at  regarding a 

right trigeminal neuralgia.





Past Medical History


Cardiovascular:  HTN





Past Surgical History


Past Surgical History:  Cholecystectomy, Other (Right fifth digit amputation due

to work accident)





Family History


Family History:  No pertinent hx





Social History


Social History


, rare alcohol, ex-smoker





Current Medications


Current Medications





Current Medications


Acetaminophen (Tylenol) 650 mg PRN Q6HRS  PRN PO MILD PAIN / TEMP > 100.3'F;  

Start 1/19/21 at 19:00


Aspirin (Ecotrin) 325 mg DAILYWBKFT PO  Last administered on 1/20/21at 07:02;  

Start 1/20/21 at 08:00


Aspirin (Aspirin Rectal Supp) 300 mg PRN DAILY  PRN [facility] IF UNABLE TO TAKE PO;  

Start 1/19/21 at 19:00


Zolpidem Tartrate (Ambien) 5 mg PRN QHS  PRN PO INSOMNIA Last administered on 1/ 19/21at 21:02;  Start 1/19/21 at 20:00


Nicotine (Nicoderm Cq 7mg) 1 patch PRN DAILY  PRN TD SMOKING CESSATION Last 

administered on 1/19/21at 21:08;  Start 1/19/21 at 20:00


Enoxaparin Sodium (Lovenox Per Pharmacy Prophylaxis Dosing) 1 each PRN DAILY  

PRN [facility] SEE COMMENTS;  Start 1/19/21 at 20:00


Enoxaparin Sodium (Lovenox 40mg Syringe) 40 mg DAILY SQ  Last administered on 

1/20/21at 09:57;  Start 1/20/21 at 09:00


Gabapentin (Neurontin) 300 mg TID PO  Last administered on 1/20/21at 11:25;  

Start 1/19/21 at 21:00


Levothyroxine Sodium (Synthroid) 75 mcg DAILY07 PO  Last administered on 

1/20/21at 07:01;  Start 1/20/21 at 07:00


Lisinopril (Prinivil) 20 mg DAILY PO  Last administered on 1/20/21at 11:25;  

Start 1/20/21 at 09:00


Naproxen (Naprosyn) 500 mg PRN DAILY  PRN PO PAIN;  Start 1/19/21 at 21:00


Carbamazepine (TEGretol) 100 mg BID PO  Last administered on 1/20/21at 11:25;  

Start 1/19/21 at 21:00


Influenza Virus Vaccine Quadrival (Fluzone Quad [id number] Syringe) 0.5 ml ONCE 

ONCE VAX IM  Last administered on 1/20/21at 10:04;  Start 1/20/21 at 09:00;  

Stop 1/20/21 at 09:01;  Status DC





Active Scripts


Active


Reported


Levothyroxine Sodium 75 Mcg Tablet 1 Tab PO DAILY


Lisinopril 20 Mg Tablet 1 Tab PO DAILY


Carbamazepine 100 Mg Tab.chew 1 Tab PO BID


Naproxen 500 Mg Tablet 500 Mg PO PRN DAILY PRN


Gabapentin 300 Mg Capsule 1 Cap PO TID





Allergies


Allergies:  


Coded Allergies:  


     No Known Drug Allergies (Unverified , 4/11/16)





ROS


Review of System


Negative for fever, chills, weight loss, shortness of breath, chest pain, 

indigestion, hematochezia, melena, and dysuria.  Full 14-point review of systems

is negative.





Physical Exam


Physical Examination


General:


Well-developed, well-nourished black female in no acute distress


HEENT:


Normocephalic andatraumatic. Tympanic membranes clear.Temporal 

arteriespulsatile and nontender.


Neck:


Supple without bruit, no meningismus


Musculoskeletal:


Stability:see neurologic. Gait exam:see neurologic. Tone:see 

neurologic.Strength:see neurologic.


Neurological:


Mental Status:intact, orientation, memory, attention span/concentration, 

language, fund of knowledge normal. Cranial Nerves:Pupils equal and reactive to

light, extraocular movements areintact, visual fields are full to 

confrontation. Facial sensation is normal.  There is a left lower motor neuron 

facial weakness, there is definitely difficulty closing the left eye now. 

Vestibulo-ocular reflex is intact. Palate elevates and tongue protrudes in 

midline. All other cranial related problems are negative except as mentioned 

before.Reflexes:2+ and symmetric with flexor plantar responses. Motor:5/5 

strength with normal tone and bulk. Coordination:Finger-nose finger and 

heel-to-shin testing are normal. Rapid alternating movements and fine finger 

movements are intact. Gait:Apraxic, normally uses a walker at home. 

Sensory:Normal pinprick, vibration, light touch, proprioception.





Vitals


VITALS





Vital Signs








  Date Time  Temp Pulse Resp B/P (MAP) Pulse Ox O2 Delivery O2 Flow Rate FiO2


 


1/20/21 11:25  70  164/69    


 


1/20/21 10:56 98.8  16  94 Room Air  





 98.8       











Labs


Labs





Laboratory Tests








Test


 1/19/21


14:56 1/19/21


15:00 1/20/21


05:50


 


Glucose (Fingerstick)


 108 mg/dL


(70-99) 


 





 


White Blood Count


 


 4.5 x10^3/uL


(4.0-11.0) 





 


Red Blood Count


 


 3.74 x10^6/uL


(3.50-5.40) 





 


Hemoglobin


 


 12.9 g/dL


(12.0-15.5) 





 


Hematocrit


 


 38.0 %


(36.0-47.0) 





 


Mean Corpuscular Volume


 


 102 fL


() 





 


Mean Corpuscular Hemoglobin  35 pg (25-35)  


 


Mean Corpuscular Hemoglobin


Concent 


 34 g/dL


(31-37) 





 


Red Cell Distribution Width


 


 15.0 %


(11.5-14.5) 





 


Platelet Count


 


 240 x10^3/uL


(140-400) 





 


Prothrombin Time


 


 12.9 SEC


(11.7-14.0) 





 


Prothromb Time International


Ratio 


 1.0 (0.8-1.1) 


 





 


Activated Partial


Thromboplast Time 


 32 SEC (24-38) 


 





 


Sodium Level


 


 140 mmol/L


(136-145) 





 


Potassium Level


 


 3.9 mmol/L


(3.5-5.1) 





 


Chloride Level


 


 104 mmol/L


() 





 


Carbon Dioxide Level


 


 28 mmol/L


(21-32) 





 


Anion Gap  8 (6-14)  


 


Blood Urea Nitrogen


 


 15 mg/dL


(7-20) 





 


Creatinine


 


 1.0 mg/dL


(0.6-1.0) 





 


Estimated GFR


(Cockcroft-Gault) 


 64.2 


 





 


Glucose Level


 


 90 mg/dL


(70-99) 





 


Calcium Level


 


 9.3 mg/dL


(8.5-10.1) 





 


Triglycerides Level


 


 


 120 mg/dL


(0-150)


 


Cholesterol Level


 


 


 192 mg/dL


(0-200)


 


LDL Cholesterol, Calculated


 


 


 106 mg/dL


(0-100)


 


VLDL Cholesterol, Calculated


 


 


 24 mg/dL


(0-40)


 


Non-HDL Cholesterol Calculated


 


 


 130 mg/dL


(0-129)


 


HDL Cholesterol


 


 


 62 mg/dL


(40-60)


 


Cholesterol/HDL Ratio   3.1 








Laboratory Tests








Test


 1/19/21


14:56 1/19/21


15:00 1/20/21


05:50


 


Glucose (Fingerstick)


 108 mg/dL


(70-99) 


 





 


White Blood Count


 


 4.5 x10^3/uL


(4.0-11.0) 





 


Red Blood Count


 


 3.74 x10^6/uL


(3.50-5.40) 





 


Hemoglobin


 


 12.9 g/dL


(12.0-15.5) 





 


Hematocrit


 


 38.0 %


(36.0-47.0) 





 


Mean Corpuscular Volume


 


 102 fL


() 





 


Mean Corpuscular Hemoglobin  35 pg (25-35)  


 


Mean Corpuscular Hemoglobin


Concent 


 34 g/dL


(31-37) 





 


Red Cell Distribution Width


 


 15.0 %


(11.5-14.5) 





 


Platelet Count


 


 240 x10^3/uL


(140-400) 





 


Prothrombin Time


 


 12.9 SEC


(11.7-14.0) 





 


Prothromb Time International


Ratio 


 1.0 (0.8-1.1) 


 





 


Activated Partial


Thromboplast Time 


 32 SEC (24-38) 


 





 


Sodium Level


 


 140 mmol/L


(136-145) 





 


Potassium Level


 


 3.9 mmol/L


(3.5-5.1) 





 


Chloride Level


 


 104 mmol/L


() 





 


Carbon Dioxide Level


 


 28 mmol/L


(21-32) 





 


Anion Gap  8 (6-14)  


 


Blood Urea Nitrogen


 


 15 mg/dL


(7-20) 





 


Creatinine


 


 1.0 mg/dL


(0.6-1.0) 





 


Estimated GFR


(Cockcroft-Gault) 


 64.2 


 





 


Glucose Level


 


 90 mg/dL


(70-99) 





 


Calcium Level


 


 9.3 mg/dL


(8.5-10.1) 





 


Triglycerides Level


 


 


 120 mg/dL


(0-150)


 


Cholesterol Level


 


 


 192 mg/dL


(0-200)


 


LDL Cholesterol, Calculated


 


 


 106 mg/dL


(0-100)


 


VLDL Cholesterol, Calculated


 


 


 24 mg/dL


(0-40)


 


Non-HDL Cholesterol Calculated


 


 


 130 mg/dL


(0-129)


 


HDL Cholesterol


 


 


 62 mg/dL


(40-60)


 


Cholesterol/HDL Ratio   3.1 











Images


Images


CT Head without IV contrast





INDICATION: Reason: LEFT SIDED FACIAL DROOP / Spl. Instructions:  / History: 





TECHNIQUE: Multi-detector row CT images were obtained of the head without the 

use of IV contrast. All CT scans performed at this facility utilize dose 

optimization techniques as appropriate to the exam, including the following: 

Automated exposure control and adjustment of the mA and/or KV according to 

patient size (this includes techniques or standardized protocols for targeted 

exams where dose is indication/reason for exam).





COMPARISON: Noncontrast head CT of 11/13/2010





FINDINGS: 





BRAIN PARENCHYMA: No evidence of acute intraparenchymal hemorrhage or infarct. 

Mild generalized parenchymal volume loss and white matter low density compatible

with chronic ischemic microvascular change is present. Tiny lucencies in the 

dolly, bilateral basal ganglia, and left thalamus are present, compatible with 

multiple chronic lacunar infarcts.





VENTRICLES & EXTRA-AXIAL SPACES:  Ventricles are enlarged in proportion to the 

degree of parenchymal volume loss present. The subarachnoid hemorrhage evident 

previously has since resolved.. Basilar cisterns are patent. No pathologic extr

a-axial fluid collection or mass.





ORBITS: Orbital contents are unremarkable.





SINUSES:  Visualized paranasal sinuses and mastoid air cells are clear.





OSSEOUS & SOFT TISSUES:  Calvarium and skull base are intact.





IMPRESSION:





Atrophy, chronic ischemic microvascular changes, and chronic lacunar infarcts. 

No acute intracranial pathology.





Assessment/Plan


Assessment/Plan


Impression:


Left Bell's palsy,, Mr. Raygoza felt that only the lower face is involved, now

clearly the upper face is involved as well, furthermore now the patient is 

having the classic fullness in the ipsilateral ear.


History of right trigeminal neuralgia.


Hypertension


Fairly favorable lipid profile


Longstanding gait disorder





Recommendations:


MRI of the brain


If this does not show a stroke, suggest a pulse of steroids, 40 mg today, 30 mg 

tomorrow, 20 mg on 1/22, 10 mg on 1/23, then stop.


No need for further stroke work-up unless MRI is abnormal


No need for statin unless MRI is abnormal


Aim for discharge later today if MRI is normal.


Fully discussed with patient and her daughter.


Follow-up with neurology if facial palsy does not improve over the next few 

weeks.





Thank you for letting me help with the patient's care.











KP LAZAR MD           Jan 20, 2021 12:03

## 2021-01-20 NOTE — EKG
[facility]

              [address] [place] [address]

Test Date:    2021               Test Time:    15:04:20

Pat Name:     ALEX HDEZ           Department:   

Patient ID:   [id number]           Room:          

Gender:       F                        Technician:   

:          [id number]               Requested By: ARANZA CAMACHO

Order Number: [id number]           Reading MD:     

                                 Measurements

Intervals                              Axis          

Rate:         81                       P:            29

AZ:           152                      QRS:          28

QRSD:         86                       T:            40

QT:           408                                    

QTc:          474                                    

                           Interpretive Statements

SINUS RHYTHM

PROLONGED QT

NO SPECIFIC ECG ABNORMALITIES

RI6.01

No previous ECG available for comparison